# Patient Record
Sex: MALE
[De-identification: names, ages, dates, MRNs, and addresses within clinical notes are randomized per-mention and may not be internally consistent; named-entity substitution may affect disease eponyms.]

---

## 2021-10-02 ENCOUNTER — HOSPITAL ENCOUNTER (EMERGENCY)
Dept: HOSPITAL 97 - ER | Age: 21
Discharge: ANOTHER HOSPITAL | End: 2021-10-02
Payer: COMMERCIAL

## 2021-10-02 VITALS — TEMPERATURE: 98.5 F | DIASTOLIC BLOOD PRESSURE: 66 MMHG | SYSTOLIC BLOOD PRESSURE: 119 MMHG

## 2021-10-02 VITALS — OXYGEN SATURATION: 100 %
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
## 2021-10-02 DIAGNOSIS — S12.120A: Primary | ICD-10-CM

## 2021-10-02 LAB
ALBUMIN SERPL BCP-MCNC: 4.5 G/DL (ref 3.4–5)
ALP SERPL-CCNC: 119 U/L (ref 45–117)
ALT SERPL W P-5'-P-CCNC: 70 U/L (ref 12–78)
AST SERPL W P-5'-P-CCNC: 38 U/L (ref 15–37)
BUN BLD-MCNC: 6 MG/DL (ref 7–18)
GLUCOSE SERPLBLD-MCNC: 156 MG/DL (ref 74–106)
HCT VFR BLD CALC: 47.8 % (ref 39.6–49)
INR BLD: 1.11
LYMPHOCYTES # SPEC AUTO: 3 K/UL (ref 0.7–4.9)
METHAMPHET UR QL SCN: NEGATIVE
PMV BLD: 8.4 FL (ref 7.6–11.3)
POTASSIUM SERPL-SCNC: 3.7 MMOL/L (ref 3.5–5.1)
RBC # BLD: 5.48 M/UL (ref 4.33–5.43)
THC SERPL-MCNC: NEGATIVE NG/ML
TROPONIN (EMERG DEPT USE ONLY): < 0.02 NG/ML (ref 0–0.04)

## 2021-10-02 PROCEDURE — 96375 TX/PRO/DX INJ NEW DRUG ADDON: CPT

## 2021-10-02 PROCEDURE — 80048 BASIC METABOLIC PNL TOTAL CA: CPT

## 2021-10-02 PROCEDURE — 36415 COLL VENOUS BLD VENIPUNCTURE: CPT

## 2021-10-02 PROCEDURE — 85730 THROMBOPLASTIN TIME PARTIAL: CPT

## 2021-10-02 PROCEDURE — 86901 BLOOD TYPING SEROLOGIC RH(D): CPT

## 2021-10-02 PROCEDURE — 70450 CT HEAD/BRAIN W/O DYE: CPT

## 2021-10-02 PROCEDURE — 80320 DRUG SCREEN QUANTALCOHOLS: CPT

## 2021-10-02 PROCEDURE — 80076 HEPATIC FUNCTION PANEL: CPT

## 2021-10-02 PROCEDURE — 86850 RBC ANTIBODY SCREEN: CPT

## 2021-10-02 PROCEDURE — 82550 ASSAY OF CK (CPK): CPT

## 2021-10-02 PROCEDURE — 85025 COMPLETE CBC W/AUTO DIFF WBC: CPT

## 2021-10-02 PROCEDURE — 71260 CT THORAX DX C+: CPT

## 2021-10-02 PROCEDURE — 86900 BLOOD TYPING SEROLOGIC ABO: CPT

## 2021-10-02 PROCEDURE — 99285 EMERGENCY DEPT VISIT HI MDM: CPT

## 2021-10-02 PROCEDURE — 72125 CT NECK SPINE W/O DYE: CPT

## 2021-10-02 PROCEDURE — 74177 CT ABD & PELVIS W/CONTRAST: CPT

## 2021-10-02 PROCEDURE — 81003 URINALYSIS AUTO W/O SCOPE: CPT

## 2021-10-02 PROCEDURE — 85610 PROTHROMBIN TIME: CPT

## 2021-10-02 PROCEDURE — 93005 ELECTROCARDIOGRAM TRACING: CPT

## 2021-10-02 PROCEDURE — 96374 THER/PROPH/DIAG INJ IV PUSH: CPT

## 2021-10-02 PROCEDURE — 80307 DRUG TEST PRSMV CHEM ANLYZR: CPT

## 2021-10-02 PROCEDURE — 84484 ASSAY OF TROPONIN QUANT: CPT

## 2021-10-02 NOTE — RAD REPORT
EXAM DESCRIPTION:  CT - Head C  Spine Cap W Con - 10/2/2021 6:46 am

******** ADDENDUM #1 ********

THIS REPORT CONTAINS FINDINGS THAT MAY BE CRITICAL TO PATIENT CARE:

The findings were communicated via telephone conference with Dr. [place] on 10/2/2021 5:47 AM CDT. The
 results were acknowledged and understood.

Electronically signed by:   Michael Dickinson MD   10/2/2021 7:55 AM CDT Workstation: [id number]

*** End of Addendum ***

 

EXAM DESCRIPTION:  CT Head

 

COMPARISON:  None.

 

CLINICAL HISTORY:  [facility]

 

TECHNIQUE:  Axial images were obtained from skull base to vertex without intravenous contrast.   Imag
es viewed on bone and brain windows. Multiplanar reformats were performed.   Automated exposure contr
ol was utilized on this examination as a dose lowering technique.

 

FINDINGS:  Brain parenchyma, ventricles, dura, meninges, and extra-axial spaces: Ventricles and sulci
 are normal.   No abnormal attenuation of brain parenchyma is present.    No acute intracranial hemor
rhage or abnormal extra-axial fluid collections are present.

Vascular structures: No hyperdense arteries or veins.

Calvarium, mastoid air cells, paranasal sinuses and orbits: The calvarium is normal. The mastoid air 
cells are clear. Visualized paranasal sinuses are unremarkable. Orbital structures are unremarkable.

-------------------------------

 

EXAM DESCRIPTION:  CT Cervical Spine

 

COMPARISON:  None.

 

CLINICAL HISTORY:  [facility]

 

TECHNIQUE:  Axial CT images were obtained through the entire cervical spine without   contrast. Sagit
leigh and coronal reconstructions are provided. Automated exposure control was utilized on this examina
tion as a dose lowering technique.

 

FINDINGS:  Vertebrae:   There is an acute type II odontoid fracture with 2 mm anterior displacement o
f the superior portion.

Spinal canal, foramina, and facet joints:

No significant spinal canal or foraminal stenoses. No significant facet arthropathy.

Paraspinous soft-tissues: Normal.

Thyroid: Normal.

Other Findings: None.

-------------------------------

 

EXAM DESCRIPTION:  CT Chest, Abdomen, and Pelvis

 

COMPARISON:  None.

 

CLINICAL HISTORY:  [facility]

 

TECHNIQUE:  CT images through the chest, abdomen, and pelvis following IV contrast. Multiplanar refor
mats.   Automated exposure control was utilized on this examination as a dose lowering technique.

CT CHEST

 

FINDINGS:  Heart and mediastinum: Heart size is normal. No lymphadenopathy.

Vascular: Unremarkable.

Thyroid gland: Visualized portions are normal.

Lungs: Mild bibasilar atelectasis.

Airways: No filling defects. No bronchiectasis.

Pleura: No pneumothorax. No significant pleural effusion.

Musculoskeletal and soft tissues: Within normal limits for age.

 

CT ABDOMEN & PELVIS FINDINGS:

Liver: Normal.

Gallbladder and biliary: Normal gallbladder. Unremarkable biliary tree.

Pancreas: Normal.

Spleen: Normal.

Kidneys and adrenal glands: Normal adrenal glands. Normal kidneys

Stomach and Small Bowel: The stomach and small bowel are normal.

Urinary bladder: Normal.

Prostate/Male Urogenital: Normal.

Colon and Appendix: The colon is unremarkable. No evidence of appendicitis.

Peritoneal cavity: No ascites or free air.

Retroperitoneum and lymph nodes: Normal.

Vascular: Unremarkable.

Musculoskeletal and soft tissues: Soft tissues are unremarkable.   No aggressive bone lesions.   No c
ompression fracture.

----------------------------------

 

IMPRESSION:  HEAD IMPRESSION:

No acute intracranial abnormality.

C-SPINE IMPRESSION:

Acute type II odontoid fracture.

CHEST IMPRESSION:

No acute chest process.

ABDOMEN AND PELVIS IMPRESSION:

No acute intra-abdominal abnormality.

 

Electronically signed by:   Michael Dickinson MD   10/2/2021 6:37 AM CDT Workstation: [id number]

 

 

 

 

Due to temporary technical issues with the PACS/Fluency reporting system, reports are being signed by
 the in house radiologists without review as a courtesy to insure prompt reporting. The interpreting 
radiologist is fully responsible for the content of the report.
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
## 2021-10-05 NOTE — EKG
Test Date:    2021-10-02               Test Time:    06:41:15

Technician:   ANUSHKA                                   

                                                     

MEASUREMENT RESULTS:                                       

Intervals:                                           

Rate:         124                                    

MO:           142                                    

QRSD:         82                                     

QT:           308                                    

QTc:          442                                    

Axis:                                                

P:            44                                     

MO:           142                                    

QRS:          27                                     

T:            -13                                    

                                                     

INTERPRETIVE STATEMENTS:                                       

                                                     

Sinus tachycardia

T wave abnormality, consider inferior ischemia

Abnormal ECG

No previous ECG available for comparison



Electronically Signed On 10-05-21 18:06:55 CDT by Mazin Moore